# Patient Record
Sex: FEMALE | Race: WHITE | NOT HISPANIC OR LATINO | URBAN - METROPOLITAN AREA
[De-identification: names, ages, dates, MRNs, and addresses within clinical notes are randomized per-mention and may not be internally consistent; named-entity substitution may affect disease eponyms.]

---

## 2024-08-09 ENCOUNTER — HOSPITAL ENCOUNTER (OUTPATIENT)
Dept: RADIOLOGY | Facility: HOSPITAL | Age: 24
Discharge: HOME | End: 2024-08-09
Payer: COMMERCIAL

## 2024-08-09 DIAGNOSIS — K59.04 CHRONIC IDIOPATHIC CONSTIPATION: ICD-10-CM

## 2024-08-09 PROCEDURE — 74018 RADEX ABDOMEN 1 VIEW: CPT

## 2024-08-09 PROCEDURE — 74018 RADEX ABDOMEN 1 VIEW: CPT | Performed by: RADIOLOGY

## 2025-02-20 ENCOUNTER — PHARMACY VISIT (OUTPATIENT)
Dept: PHARMACY | Facility: CLINIC | Age: 25
End: 2025-02-20
Payer: COMMERCIAL

## 2025-02-20 PROCEDURE — RXMED WILLOW AMBULATORY MEDICATION CHARGE

## 2025-02-20 RX ORDER — PRUCALOPRIDE 2 MG/1
1 TABLET ORAL DAILY
Qty: 3 TABLET | Refills: 1 | OUTPATIENT
Start: 2025-02-20

## 2025-02-22 ENCOUNTER — HOSPITAL ENCOUNTER (OUTPATIENT)
Dept: RADIOLOGY | Facility: HOSPITAL | Age: 25
Discharge: HOME | End: 2025-02-22
Payer: COMMERCIAL

## 2025-02-22 DIAGNOSIS — K59.09 CHRONIC CONSTIPATION: ICD-10-CM

## 2025-02-22 PROCEDURE — 74018 RADEX ABDOMEN 1 VIEW: CPT

## 2025-02-22 PROCEDURE — 74018 RADEX ABDOMEN 1 VIEW: CPT | Performed by: RADIOLOGY

## 2025-08-04 ENCOUNTER — APPOINTMENT (OUTPATIENT)
Dept: PRIMARY CARE | Facility: CLINIC | Age: 25
End: 2025-08-04
Payer: COMMERCIAL

## 2025-08-04 VITALS
WEIGHT: 104 LBS | OXYGEN SATURATION: 98 % | SYSTOLIC BLOOD PRESSURE: 115 MMHG | BODY MASS INDEX: 17.75 KG/M2 | HEIGHT: 64 IN | HEART RATE: 68 BPM | DIASTOLIC BLOOD PRESSURE: 71 MMHG

## 2025-08-04 DIAGNOSIS — D56.3 BETA THALASSEMIA MINOR: ICD-10-CM

## 2025-08-04 DIAGNOSIS — K58.1 IRRITABLE BOWEL SYNDROME WITH CONSTIPATION: ICD-10-CM

## 2025-08-04 DIAGNOSIS — R76.8 ELEVATED IGE LEVEL: ICD-10-CM

## 2025-08-04 DIAGNOSIS — K90.0 CD (CELIAC DISEASE) (HHS-HCC): ICD-10-CM

## 2025-08-04 DIAGNOSIS — J45.30 MILD PERSISTENT ASTHMA WITHOUT COMPLICATION (HHS-HCC): Primary | ICD-10-CM

## 2025-08-04 PROCEDURE — 3008F BODY MASS INDEX DOCD: CPT | Performed by: FAMILY MEDICINE

## 2025-08-04 PROCEDURE — 99203 OFFICE O/P NEW LOW 30 MIN: CPT | Performed by: FAMILY MEDICINE

## 2025-08-04 RX ORDER — ALBUTEROL SULFATE 90 UG/1
2 INHALANT RESPIRATORY (INHALATION) EVERY 4 HOURS PRN
COMMUNITY
End: 2025-08-04 | Stop reason: SDUPTHER

## 2025-08-04 RX ORDER — EPINEPHRINE 0.3 MG/.3ML
INJECTION, SOLUTION INTRAMUSCULAR
COMMUNITY

## 2025-08-04 RX ORDER — POLYETHYLENE GLYCOL 3350 17 G/17G
17 POWDER, FOR SOLUTION ORAL DAILY
COMMUNITY

## 2025-08-04 RX ORDER — TENAPANOR HYDROCHLORIDE 53.2 MG/1
50 TABLET ORAL 2 TIMES DAILY
COMMUNITY

## 2025-08-04 RX ORDER — LINACLOTIDE 145 UG/1
CAPSULE, GELATIN COATED ORAL
COMMUNITY
Start: 2025-07-22

## 2025-08-04 RX ORDER — BUDESONIDE AND FORMOTEROL FUMARATE DIHYDRATE 160; 4.5 UG/1; UG/1
2 AEROSOL RESPIRATORY (INHALATION) 2 TIMES DAILY
COMMUNITY
End: 2025-08-04 | Stop reason: SDUPTHER

## 2025-08-04 RX ORDER — ALBUTEROL SULFATE 90 UG/1
2 INHALANT RESPIRATORY (INHALATION) EVERY 4 HOURS PRN
Qty: 18 G | Refills: 3 | Status: SHIPPED | OUTPATIENT
Start: 2025-08-04

## 2025-08-04 RX ORDER — BUDESONIDE AND FORMOTEROL FUMARATE DIHYDRATE 160; 4.5 UG/1; UG/1
2 AEROSOL RESPIRATORY (INHALATION) 2 TIMES DAILY
Qty: 18 G | Refills: 3 | Status: SHIPPED | OUTPATIENT
Start: 2025-08-04

## 2025-08-04 ASSESSMENT — PATIENT HEALTH QUESTIONNAIRE - PHQ9
SUM OF ALL RESPONSES TO PHQ9 QUESTIONS 1 AND 2: 0
1. LITTLE INTEREST OR PLEASURE IN DOING THINGS: NOT AT ALL
2. FEELING DOWN, DEPRESSED OR HOPELESS: NOT AT ALL

## 2025-08-04 NOTE — PROGRESS NOTES
"Subjective   Patient ID: Agustina Weinstein is a 25 y.o. female who presents for Thalassemia (Pt states she is here to establish care also to discuss med refills and lab work. Pt states she doesn't see an OBGYN. Last pap not yet. No other concerns at the moment.).  She has thalassemia and is overdue for labs.      She also has a relative new Celiac dx, and has recently found meds that work well as long as she avoids gluten.      She needs Rfs on her asthma meds, which she feels is well controlled now with regular symbicort.    Histories reviewed in full      Objective   /71   Pulse 68   Ht 1.626 m (5' 4\")   Wt 47.2 kg (104 lb)   LMP 07/21/2025   SpO2 98%   BMI 17.85 kg/m²    Physical Exam    Alert adult, NAD, body wt thin  Affect pleasant and appropriate  Eyes: PERRLA, EOMI, clear bilat  Neck supple, No LAD, No thyromegaly  Heart RRR no murmur  Lungs CTA bilat  Abdomen: pos BS, soft NT/ND  Skin warm dry and clear      Assessment & Plan  Mild persistent asthma without complication ([facility])  Stable   Orders:    budesonide-formoterol (Symbicort) 160-4.5 mcg/actuation inhaler; Inhale 2 puffs 2 times a day.    albuterol 90 mcg/actuation inhaler; Inhale 2 puffs every 4 hours if needed for wheezing or shortness of breath.    IgE    Beta thalassemia minor    Orders:    CBC    Irritable bowel syndrome with constipation  She follows with GI       CD (celiac disease) ([facility])    Orders:    IgE    Elevated IgE level  Hx of very elevated level, wants to see if coming down. Pt says her IgE was so high in the past she is not even a candidate for Xolair  Orders:    IgE      "

## 2025-08-04 NOTE — ASSESSMENT & PLAN NOTE
Stable   Orders:    budesonide-formoterol (Symbicort) 160-4.5 mcg/actuation inhaler; Inhale 2 puffs 2 times a day.    albuterol 90 mcg/actuation inhaler; Inhale 2 puffs every 4 hours if needed for wheezing or shortness of breath.    IgE

## 2025-08-07 LAB
ERYTHROCYTE [DISTWIDTH] IN BLOOD BY AUTOMATED COUNT: 16.1 % (ref 11–15)
HCT VFR BLD AUTO: 38.3 % (ref 35–45)
HGB BLD-MCNC: 10.9 G/DL (ref 11.7–15.5)
IGE SERPL-ACNC: 7634 KU/L
MCH RBC QN AUTO: 20.1 PG (ref 27–33)
MCHC RBC AUTO-ENTMCNC: 28.5 G/DL (ref 32–36)
MCV RBC AUTO: 70.5 FL (ref 80–100)
PLATELET # BLD AUTO: 291 THOUSAND/UL (ref 140–400)
PMV BLD REES-ECKER: 11.2 FL (ref 7.5–12.5)
RBC # BLD AUTO: 5.43 MILLION/UL (ref 3.8–5.1)
WBC # BLD AUTO: 6.7 THOUSAND/UL (ref 3.8–10.8)